# Patient Record
Sex: MALE | Race: ASIAN | NOT HISPANIC OR LATINO
[De-identification: names, ages, dates, MRNs, and addresses within clinical notes are randomized per-mention and may not be internally consistent; named-entity substitution may affect disease eponyms.]

---

## 2017-02-10 ENCOUNTER — APPOINTMENT (OUTPATIENT)
Dept: CARDIOLOGY | Facility: CLINIC | Age: 52
End: 2017-02-10

## 2017-02-10 VITALS
HEIGHT: 67.6 IN | WEIGHT: 200 LBS | OXYGEN SATURATION: 97 % | SYSTOLIC BLOOD PRESSURE: 125 MMHG | DIASTOLIC BLOOD PRESSURE: 80 MMHG | HEART RATE: 88 BPM | BODY MASS INDEX: 30.67 KG/M2 | TEMPERATURE: 98.1 F | RESPIRATION RATE: 17 BRPM

## 2017-02-10 DIAGNOSIS — F17.200 NICOTINE DEPENDENCE, UNSPECIFIED, UNCOMPLICATED: ICD-10-CM

## 2017-02-10 DIAGNOSIS — E78.5 HYPERLIPIDEMIA, UNSPECIFIED: ICD-10-CM

## 2017-02-10 DIAGNOSIS — R06.02 SHORTNESS OF BREATH: ICD-10-CM

## 2017-02-10 DIAGNOSIS — E11.9 TYPE 2 DIABETES MELLITUS W/OUT COMPLICATIONS: ICD-10-CM

## 2017-02-10 DIAGNOSIS — R00.2 PALPITATIONS: ICD-10-CM

## 2017-02-10 PROBLEM — Z00.00 ENCOUNTER FOR PREVENTIVE HEALTH EXAMINATION: Status: ACTIVE | Noted: 2017-02-10

## 2017-02-17 ENCOUNTER — APPOINTMENT (OUTPATIENT)
Dept: CARDIOLOGY | Facility: CLINIC | Age: 52
End: 2017-02-17

## 2017-03-13 PROBLEM — E11.9 DIABETES MELLITUS: Status: ACTIVE | Noted: 2017-03-13

## 2017-03-13 PROBLEM — E78.5 HLD (HYPERLIPIDEMIA): Status: ACTIVE | Noted: 2017-03-13

## 2017-03-13 PROBLEM — F17.200 CURRENT SMOKER: Status: ACTIVE | Noted: 2017-03-13

## 2017-03-13 PROBLEM — R00.2 PALPITATIONS: Status: ACTIVE | Noted: 2017-03-13

## 2017-03-13 RX ORDER — METFORMIN ER 500 MG 500 MG/1
500 TABLET ORAL
Refills: 0 | Status: ACTIVE | COMMUNITY

## 2017-03-13 RX ORDER — ATORVASTATIN CALCIUM 20 MG/1
20 TABLET, FILM COATED ORAL
Refills: 0 | Status: ACTIVE | COMMUNITY

## 2017-03-13 RX ORDER — ASPIRIN ENTERIC COATED TABLETS 81 MG 81 MG/1
81 TABLET, DELAYED RELEASE ORAL
Refills: 0 | Status: ACTIVE | COMMUNITY

## 2021-03-03 ENCOUNTER — APPOINTMENT (OUTPATIENT)
Dept: CARDIOLOGY | Facility: CLINIC | Age: 56
End: 2021-03-03
Payer: MEDICAID

## 2021-03-03 VITALS
OXYGEN SATURATION: 97 % | BODY MASS INDEX: 33.08 KG/M2 | WEIGHT: 215 LBS | RESPIRATION RATE: 18 BRPM | HEART RATE: 93 BPM | SYSTOLIC BLOOD PRESSURE: 131 MMHG | DIASTOLIC BLOOD PRESSURE: 91 MMHG | TEMPERATURE: 97.4 F

## 2021-03-03 PROCEDURE — 93306 TTE W/DOPPLER COMPLETE: CPT

## 2021-03-03 PROCEDURE — 99072 ADDL SUPL MATRL&STAF TM PHE: CPT

## 2021-03-03 PROCEDURE — 99214 OFFICE O/P EST MOD 30 MIN: CPT | Mod: 25

## 2021-08-05 NOTE — HISTORY OF PRESENT ILLNESS
[FreeTextEntry1] : 55-year-old male with aortic aneurysm, DM and HLD presents for followup.  \par \par Patient was last seen on 2/10/17 for evaluation of shortness of breath.  Patient underwent a stress echo and completed 6 minutes of Rony protocol. There were downsloping ST depressions on ECG but there was no echocardiographic evidence of ischemia. I advised patient that at this time there is no evidence of epicardial CAD but he likely has microvascular disease.  I advised patient to continue ASA and Atorvastatin for cardioprotection.  Patient was also noted to have moderate dilatation of the ascending aorta (4.7 cm). \par \par 3/3/21 - Patient denies CP. Patient denies SOB. Patient reports that he quit smoking for 6 months so far. Patient reports occasional palpitations with exertion and also has fast HR. Patient is advised to exercise and watch diet. I advised patient to undergo an echocardiogram for aortic aneurysm.

## 2021-08-05 NOTE — DISCUSSION/SUMMARY
[FreeTextEntry1] : 55-year-old male with aortic aneurysm, DM and HLD presents for followup.  \par \par Patient was last seen on 2/10/17 for evaluation of shortness of breath.  Patient underwent a stress echo and completed 6 minutes of Rony protocol. There were downsloping ST depressions on ECG but there was no echocardiographic evidence of ischemia. I advised patient that at this time there is no evidence of epicardial CAD but he likely has microvascular disease.  I advised patient to continue ASA and Atorvastatin for cardioprotection.  Patient was also noted to have moderate dilatation of the ascending aorta (4.7 cm). \par \par 3/3/21 - Patient denies CP. Patient denies SOB. Patient reports that he quit smoking for 6 months so far. Patient reports occasional palpitations with exertion and also has fast HR. Patient is advised to exercise and watch diet. I advised patient to undergo an echocardiogram for aortic aneurysm.\par \par (1) Aortic aneurysm Patient underwent an echocardiogram and it showed normal LV function, moderate dilatation of the ascending aorta (4.9 cm), without significant valvular pathology.  I advised patient to keep BP under good control.  I advised patient to start Metoprolol ER 25 mg.  Patient should have a followup echo in 1 year. \par \par (2) Followup - 1 year.

## 2021-08-05 NOTE — REASON FOR VISIT
[Follow-Up - Clinic] : a clinic follow-up of [Dyspnea] : dyspnea [FreeTextEntry1] : 3/3/21 - Patient denies CP. Patient denies SOB. Patient reports that he quit smoking for 6 months so far. Patient reports occasional palpitations with exertion and also has fast HR. Patient is advised to exercise and watch diet. I advised patient to undergo an echocardiogram for aortic aneurysm.\par \par 2/10/17 - Patient denies chest pain. Patient reports SOB with exertion. Patient reports palpitations, occurring with exertion. Patient denies history of syncope.

## 2021-08-05 NOTE — PHYSICAL EXAM
[General Appearance - Well Developed] : well developed [Normal Appearance] : normal appearance [Well Groomed] : well groomed [General Appearance - Well Nourished] : well nourished [No Deformities] : no deformities [General Appearance - In No Acute Distress] : no acute distress [Normal Conjunctiva] : the conjunctiva exhibited no abnormalities [Eyelids - No Xanthelasma] : the eyelids demonstrated no xanthelasmas [Normal Oral Mucosa] : normal oral mucosa [No Oral Pallor] : no oral pallor [No Oral Cyanosis] : no oral cyanosis [Normal Jugular Venous A Waves Present] : normal jugular venous A waves present [Normal Jugular Venous V Waves Present] : normal jugular venous V waves present [No Jugular Venous Graves A Waves] : no jugular venous graves A waves [Respiration, Rhythm And Depth] : normal respiratory rhythm and effort [Exaggerated Use Of Accessory Muscles For Inspiration] : no accessory muscle use [Auscultation Breath Sounds / Voice Sounds] : lungs were clear to auscultation bilaterally [Heart Rate And Rhythm] : heart rate and rhythm were normal [Heart Sounds] : normal S1 and S2 [Arterial Pulses Normal] : the arterial pulses were normal [Abdomen Soft] : soft [Abdomen Tenderness] : non-tender [Abdomen Mass (___ Cm)] : no abdominal mass palpated [Abnormal Walk] : normal gait [Gait - Sufficient For Exercise Testing] : the gait was sufficient for exercise testing [Nail Clubbing] : no clubbing of the fingernails [Cyanosis, Localized] : no localized cyanosis [Petechial Hemorrhages (___cm)] : no petechial hemorrhages [] : no ischemic changes [Oriented To Time, Place, And Person] : oriented to person, place, and time [Affect] : the affect was normal [Mood] : the mood was normal [No Anxiety] : not feeling anxious [FreeTextEntry1] : 2/6 BERNARDO at apex and LUSB; 1+ edema noted.

## 2022-01-22 PROBLEM — R94.39 ABNORMAL CARDIOVASCULAR STRESS TEST: Status: ACTIVE | Noted: 2021-03-01

## 2022-01-24 ENCOUNTER — NON-APPOINTMENT (OUTPATIENT)
Age: 57
End: 2022-01-24

## 2022-01-24 ENCOUNTER — APPOINTMENT (OUTPATIENT)
Dept: CARDIOLOGY | Facility: CLINIC | Age: 57
End: 2022-01-24
Payer: MEDICAID

## 2022-01-24 VITALS
RESPIRATION RATE: 18 BRPM | HEART RATE: 98 BPM | WEIGHT: 208 LBS | BODY MASS INDEX: 32 KG/M2 | OXYGEN SATURATION: 97 % | TEMPERATURE: 97.6 F | SYSTOLIC BLOOD PRESSURE: 127 MMHG | DIASTOLIC BLOOD PRESSURE: 83 MMHG

## 2022-01-24 DIAGNOSIS — R94.39 ABNORMAL RESULT OF OTHER CARDIOVASCULAR FUNCTION STUDY: ICD-10-CM

## 2022-01-24 PROCEDURE — 99214 OFFICE O/P EST MOD 30 MIN: CPT

## 2022-01-24 PROCEDURE — 93000 ELECTROCARDIOGRAM COMPLETE: CPT

## 2022-02-03 ENCOUNTER — APPOINTMENT (OUTPATIENT)
Dept: CARDIOLOGY | Facility: CLINIC | Age: 57
End: 2022-02-03
Payer: MEDICAID

## 2022-02-03 VITALS — DIASTOLIC BLOOD PRESSURE: 91 MMHG | TEMPERATURE: 98 F | SYSTOLIC BLOOD PRESSURE: 146 MMHG

## 2022-02-03 PROCEDURE — 93306 TTE W/DOPPLER COMPLETE: CPT

## 2022-02-10 ENCOUNTER — NON-APPOINTMENT (OUTPATIENT)
Age: 57
End: 2022-02-10

## 2022-05-09 NOTE — DISCUSSION/SUMMARY
[FreeTextEntry1] : 56-year-old male former smoker with aortic aneurysm, DM and HLD presents for followup.  \par \par Patient was last seen on 3/3/21.  Patient underwent an echocardiogram and it showed normal LV function, moderate dilatation of the ascending aorta (4.9 cm), without significant valvular pathology. \par \par He is on ASA and Atorvastatin for cardioprotection.  He is on Metoprolol ER 25 mg for aortic aneurysm. \par \par 1/24/22 - Patient has been stable.  He is here for followup of aortic aneurysm.  Patient denies CP, SOB, palpitations, or lightheadedness.  Patient is compliant with medications.  He has quit smoking for 1 year.  I advised patient to undergo an echocardiogram.  I will obtain insurance authorization ( aortic aneurysm 4.9 cm).\par \par (1) Aortic aneurysm - Patient underwent an echocardiogram and it showed normal LV function, moderate dilatation of the ascending aorta (4.9 cm), with bicuspid aortic valve and moderate aortic stenosis (valve area 1.5 cm2).  I advised patient to keep BP under good control.  Patient should have a followup echo in 1 year. \par \par (2) Followup - 1 year.

## 2022-05-09 NOTE — HISTORY OF PRESENT ILLNESS
[FreeTextEntry1] : 1/24/22 - Patient has been stable.  He is here for followup of aortic aneurysm.  Patient denies CP, SOB, palpitations, or lightheadedness.  Patient is compliant with medications.  He has quit smoking for 1 year.  I advised patient to undergo an echocardiogram.  I will obtain insurance authorization ( aortic aneurysm 4.9 cm).\par \par 3/3/21 - Patient denies CP. Patient denies SOB. Patient reports that he quit smoking for 6 months so far. Patient reports occasional palpitations with exertion and also has fast HR. Patient is advised to exercise and watch diet. I advised patient to undergo an echocardiogram for aortic aneurysm.\par \par 2/10/17 - Patient denies chest pain. Patient reports SOB with exertion. Patient reports palpitations, occurring with exertion. Patient denies history of syncope.

## 2022-05-09 NOTE — REASON FOR VISIT
[FreeTextEntry1] : 56-year-old male former smoker with aortic aneurysm, DM and HLD presents for followup.  \par \par Patient was last seen on 3/3/21.  Patient underwent an echocardiogram and it showed normal LV function, moderate dilatation of the ascending aorta (4.9 cm), without significant valvular pathology. \par \par He is on ASA and Atorvastatin for cardioprotection.  He is on Metoprolol ER 25 mg for aortic aneurysm. \par \par \par

## 2022-05-09 NOTE — CARDIOLOGY SUMMARY
[Normal] : normal [de-identified] : Patient underwent a stress echo 2/17/17 and completed 6 minutes of Rony protocol. There were downsloping ST depressions on ECG but there was no echocardiographic evidence of ischemia.  [de-identified] : Patient underwent a stress echo 2/17/17 and completed 6 minutes of Rony protocol. There were downsloping ST depressions on ECG but there was no echocardiographic evidence of ischemia.  Patient was also noted to have moderate dilatation of the ascending aorta (4.7 cm). \par \par \par

## 2023-01-30 ENCOUNTER — APPOINTMENT (OUTPATIENT)
Dept: CARDIOLOGY | Facility: CLINIC | Age: 58
End: 2023-01-30
Payer: MEDICAID

## 2023-01-30 VITALS
BODY MASS INDEX: 32 KG/M2 | RESPIRATION RATE: 18 BRPM | WEIGHT: 208 LBS | DIASTOLIC BLOOD PRESSURE: 86 MMHG | OXYGEN SATURATION: 98 % | SYSTOLIC BLOOD PRESSURE: 135 MMHG | TEMPERATURE: 98.1 F | HEART RATE: 91 BPM

## 2023-01-30 PROCEDURE — 99213 OFFICE O/P EST LOW 20 MIN: CPT | Mod: 25

## 2023-01-30 PROCEDURE — 93306 TTE W/DOPPLER COMPLETE: CPT

## 2023-01-30 RX ORDER — METOPROLOL SUCCINATE 50 MG/1
50 TABLET, EXTENDED RELEASE ORAL DAILY
Qty: 45 | Refills: 5 | Status: ACTIVE | COMMUNITY
Start: 2021-03-03 | End: 1900-01-01

## 2023-02-07 NOTE — CARDIOLOGY SUMMARY
[Normal] : normal [de-identified] : Patient underwent a stress echo 2/17/17 and completed 6 minutes of Rony protocol. There were downsloping ST depressions on ECG but there was no echocardiographic evidence of ischemia.  [de-identified] : Patient underwent a stress echo 2/17/17 and completed 6 minutes of Rony protocol. There were downsloping ST depressions on ECG but there was no echocardiographic evidence of ischemia.  Patient was also noted to have moderate dilatation of the ascending aorta (4.7 cm). \par \par \par

## 2023-02-07 NOTE — REASON FOR VISIT
[FreeTextEntry1] : 56-year-old male former smoker with aortic aneurysm, DM and HLD presents for followup.  \par \par Patient was last seen on 1/24/22 for followup.   Patient underwent an echocardiogram and it showed normal LV function, moderate dilatation of the ascending aorta (4.9 cm), bicuspid aortic valve with moderate AS (1.5 cm2).\par \par Patient underwent an echocardiogram 3/3/21 and it showed normal LV function, moderate dilatation of the ascending aorta (4.9 cm), without significant valvular pathology. \par \par He is on ASA and Atorvastatin for cardioprotection.  He is on Metoprolol ER 25 mg for aortic aneurysm. \par \par \par

## 2023-02-07 NOTE — HISTORY OF PRESENT ILLNESS
[FreeTextEntry1] : 1/30/23 - Pt has been stable.  He denies CP, SOB, and palpitations.  He is on Metoprolol ER 50 mg, ASA 81 mg and statin. His BP at home ranges 120-150s/-.   I advised patient to undergo an echocardiogram.  Patient underwent an echocardiogram and it showed normal LV function, severe aortic root dilatation (5.0 cm), with bicuspid aortic stenosis.  I advised patient to undergo a chest CTA to get an accurate assessment of the his ascending aorta.  I will obtain insurance authorization.\par \par 1/24/22 - Patient has been stable.  He is here for followup of aortic aneurysm.  Patient denies CP, SOB, palpitations, or lightheadedness.  Patient is compliant with medications.  He has quit smoking for 1 year.  I advised patient to undergo an echocardiogram.  I will obtain insurance authorization ( aortic aneurysm 4.9 cm).\par \par 3/3/21 - Patient denies CP. Patient denies SOB. Patient reports that he quit smoking for 6 months so far. Patient reports occasional palpitations with exertion and also has fast HR. Patient is advised to exercise and watch diet. I advised patient to undergo an echocardiogram for aortic aneurysm.\par \par 2/10/17 - Patient denies chest pain. Patient reports SOB with exertion. Patient reports palpitations, occurring with exertion. Patient denies history of syncope.

## 2023-10-12 ENCOUNTER — APPOINTMENT (OUTPATIENT)
Dept: CARDIOLOGY | Facility: CLINIC | Age: 58
End: 2023-10-12
Payer: MEDICAID

## 2023-10-12 VITALS
DIASTOLIC BLOOD PRESSURE: 79 MMHG | WEIGHT: 207 LBS | TEMPERATURE: 97.6 F | SYSTOLIC BLOOD PRESSURE: 128 MMHG | RESPIRATION RATE: 16 BRPM | OXYGEN SATURATION: 94 % | BODY MASS INDEX: 31.85 KG/M2 | HEART RATE: 68 BPM

## 2023-10-12 DIAGNOSIS — I71.9 AORTIC ANEURYSM OF UNSPECIFIED SITE, W/OUT RUPTURE: ICD-10-CM

## 2023-10-12 DIAGNOSIS — I35.0 NONRHEUMATIC AORTIC (VALVE) STENOSIS: ICD-10-CM

## 2023-10-12 PROCEDURE — 93306 TTE W/DOPPLER COMPLETE: CPT

## 2023-10-12 PROCEDURE — 99214 OFFICE O/P EST MOD 30 MIN: CPT

## 2023-11-22 ENCOUNTER — APPOINTMENT (OUTPATIENT)
Dept: MRI IMAGING | Facility: CLINIC | Age: 58
End: 2023-11-22

## 2024-10-01 PROBLEM — Q23.81 BICUSPID AORTIC VALVE: Status: ACTIVE | Noted: 2024-10-01

## 2024-10-02 ENCOUNTER — APPOINTMENT (OUTPATIENT)
Dept: CARDIOLOGY | Facility: CLINIC | Age: 59
End: 2024-10-02

## 2024-10-02 ENCOUNTER — APPOINTMENT (OUTPATIENT)
Dept: CARDIOLOGY | Facility: CLINIC | Age: 59
End: 2024-10-02
Payer: MEDICAID

## 2024-10-02 VITALS
WEIGHT: 212 LBS | OXYGEN SATURATION: 98 % | DIASTOLIC BLOOD PRESSURE: 80 MMHG | BODY MASS INDEX: 32.62 KG/M2 | HEART RATE: 78 BPM | RESPIRATION RATE: 16 BRPM | SYSTOLIC BLOOD PRESSURE: 131 MMHG

## 2024-10-02 DIAGNOSIS — I35.0 NONRHEUMATIC AORTIC (VALVE) STENOSIS: ICD-10-CM

## 2024-10-02 DIAGNOSIS — Q23.81 BICUSPID AORTIC VALVE: ICD-10-CM

## 2024-10-02 DIAGNOSIS — I71.9 AORTIC ANEURYSM OF UNSPECIFIED SITE, W/OUT RUPTURE: ICD-10-CM

## 2024-10-02 PROCEDURE — 93306 TTE W/DOPPLER COMPLETE: CPT

## 2024-10-02 PROCEDURE — 99214 OFFICE O/P EST MOD 30 MIN: CPT

## 2024-10-03 NOTE — CARDIOLOGY SUMMARY
[Normal] : normal [de-identified] : Patient underwent a stress echo 2/17/17 and completed 6 minutes of Rony protocol. There were downsloping ST depressions on ECG but there was no echocardiographic evidence of ischemia.  [de-identified] : Patient underwent a stress echo 2/17/17 and completed 6 minutes of Rony protocol. There were downsloping ST depressions on ECG but there was no echocardiographic evidence of ischemia.  Patient was also noted to have moderate dilatation of the ascending aorta (4.7 cm). \par  \par  \par

## 2024-10-03 NOTE — PHYSICAL EXAM
[General Appearance - Well Developed] : well developed [Normal Appearance] : normal appearance [Well Groomed] : well groomed [General Appearance - Well Nourished] : well nourished [No Deformities] : no deformities [General Appearance - In No Acute Distress] : no acute distress [Normal Conjunctiva] : the conjunctiva exhibited no abnormalities [Eyelids - No Xanthelasma] : the eyelids demonstrated no xanthelasmas [Normal Oral Mucosa] : normal oral mucosa [No Oral Pallor] : no oral pallor [No Oral Cyanosis] : no oral cyanosis [Normal Jugular Venous A Waves Present] : normal jugular venous A waves present [Normal Jugular Venous V Waves Present] : normal jugular venous V waves present [No Jugular Venous Graves A Waves] : no jugular venous graves A waves [Respiration, Rhythm And Depth] : normal respiratory rhythm and effort [Exaggerated Use Of Accessory Muscles For Inspiration] : no accessory muscle use [Auscultation Breath Sounds / Voice Sounds] : lungs were clear to auscultation bilaterally [Heart Rate And Rhythm] : heart rate and rhythm were normal [Heart Sounds] : normal S1 and S2 [Arterial Pulses Normal] : the arterial pulses were normal [Abdomen Soft] : soft [Abdomen Tenderness] : non-tender [Abdomen Mass (___ Cm)] : no abdominal mass palpated [Abnormal Walk] : normal gait [Gait - Sufficient For Exercise Testing] : the gait was sufficient for exercise testing [Nail Clubbing] : no clubbing of the fingernails [Petechial Hemorrhages (___cm)] : no petechial hemorrhages [Cyanosis, Localized] : no localized cyanosis [] : no ischemic changes [Oriented To Time, Place, And Person] : oriented to person, place, and time [Affect] : the affect was normal [Mood] : the mood was normal [No Anxiety] : not feeling anxious [FreeTextEntry1] : 2/6 BERNARDO at apex and LUSB; 1+ edema noted.

## 2024-10-03 NOTE — CARDIOLOGY SUMMARY
[Normal] : normal [de-identified] : Patient underwent a stress echo 2/17/17 and completed 6 minutes of Rony protocol. There were downsloping ST depressions on ECG but there was no echocardiographic evidence of ischemia.  [de-identified] : Patient underwent a stress echo 2/17/17 and completed 6 minutes of Rony protocol. There were downsloping ST depressions on ECG but there was no echocardiographic evidence of ischemia.  Patient was also noted to have moderate dilatation of the ascending aorta (4.7 cm). \par  \par  \par

## 2024-10-03 NOTE — REASON FOR VISIT
[FreeTextEntry1] : 59 year-old male former smoker with aortic aneurysm (4.9 cm), AS (1.5 cm2), DM, HLD, presents for followup.    Patient was last seen on 10/12/23 - Patient reports palpitations with exertion. He has trace edema, Patient denies SOB. Patient denies CP. Patient denies lightheadedness. Patient denies h/o syncope. I advised patient to undergo an echocardiogram.  Patient underwent an echocardiogram and it showed normal LV function, bicuspid aortic valve with moderate AS (1.5 cm2), moderate dilatation of the ascending aorta (5.0 cm).  MRA ascending aorta 4.7 cm.  He is on ASA and Atorvastatin for cardioprotection.  He is on Metoprolol ER 50 mg for aortic aneurysm.   Chest CTA 10/15/22 with Dr. RADHA Ernst showed ascending aortic aneurysm (5 cm).  Patient underwent an echocardiogram 2/3/22 and it showed normal LV function, moderate dilatation of the ascending aorta (4.9 cm), bicuspid aortic valve with moderate AS (1.5 cm2).  Patient underwent an echocardiogram 3/3/21 and it showed normal LV function, moderate dilatation of the ascending aorta (4.9 cm), without significant valvular pathology.

## 2024-10-03 NOTE — CARDIOLOGY SUMMARY
[Normal] : normal [de-identified] : Patient underwent a stress echo 2/17/17 and completed 6 minutes of Rony protocol. There were downsloping ST depressions on ECG but there was no echocardiographic evidence of ischemia.  [de-identified] : Patient underwent a stress echo 2/17/17 and completed 6 minutes of Rony protocol. There were downsloping ST depressions on ECG but there was no echocardiographic evidence of ischemia.  Patient was also noted to have moderate dilatation of the ascending aorta (4.7 cm). \par  \par  \par

## 2024-10-03 NOTE — HISTORY OF PRESENT ILLNESS
[FreeTextEntry1] : 10/2/24 -  Please refer to NP note below. Pt is here for follow up. Pt has been stable. Pt reports palpitations with 6 flights of stairs. Pt reports same Left LE edema. Pt denies CP, SOB, or lightheadedness. Pt denies h/o syncope. Pt denies any bleeding issues with ASA. Pt has daily walking exercise for 2 hours. His home BP ranged 120-130/80. Today's /80 P 78.  Pt is on ASA and Atorvastatin for cardioprotection. He is on Metoprolol ER 50 mg for aortic aneurysm. I advised patient to undergo an echocardiogram.   10/12/23 - Patient reports palpitations with exertion. He has trace edema, Patient denies SOB. Patient denies CP. Patient denies lightheadedness. Patient denies h/o syncope. I advised patient to undergo an echocardiogram.  Patient underwent an echocardiogram and it showed normal LV function, bicuspid aortic valve with moderate AS (1.5 cm2), moderate dilatation of the ascending aorta (5.0 cm).  MRA ascending aorta 4.7 cm.  1/30/23 - Pt has been stable.  He denies CP, SOB, and palpitations.  He is on Metoprolol ER 50 mg, ASA 81 mg and statin. His BP at home ranges 120-150s/-.   I advised patient to undergo an echocardiogram.  Patient underwent an echocardiogram and it showed normal LV function, severe aortic root dilatation (5.0 cm), with bicuspid aortic stenosis.  I advised patient to undergo a chest CTA to get an accurate assessment of the his ascending aorta.  I will obtain insurance authorization.  1/24/22 - Patient has been stable.  He is here for followup of aortic aneurysm.  Patient denies CP, SOB, palpitations, or lightheadedness.  Patient is compliant with medications.  He has quit smoking for 1 year.  I advised patient to undergo an echocardiogram.  I will obtain insurance authorization ( aortic aneurysm 4.9 cm).  3/3/21 - Patient denies CP. Patient denies SOB. Patient reports that he quit smoking for 6 months so far. Patient reports occasional palpitations with exertion and also has fast HR. Patient is advised to exercise and watch diet. I advised patient to undergo an echocardiogram for aortic aneurysm.  2/10/17 - Patient denies chest pain. Patient reports SOB with exertion. Patient reports palpitations, occurring with exertion. Patient denies history of syncope.

## 2024-12-16 ENCOUNTER — NON-APPOINTMENT (OUTPATIENT)
Age: 59
End: 2024-12-16

## 2024-12-19 ENCOUNTER — NON-APPOINTMENT (OUTPATIENT)
Age: 59
End: 2024-12-19